# Patient Record
Sex: FEMALE | Race: WHITE | NOT HISPANIC OR LATINO
[De-identification: names, ages, dates, MRNs, and addresses within clinical notes are randomized per-mention and may not be internally consistent; named-entity substitution may affect disease eponyms.]

---

## 2019-08-23 PROBLEM — Z00.00 ENCOUNTER FOR PREVENTIVE HEALTH EXAMINATION: Status: ACTIVE | Noted: 2019-08-23

## 2019-12-13 ENCOUNTER — APPOINTMENT (OUTPATIENT)
Dept: ORTHOPEDIC SURGERY | Facility: CLINIC | Age: 49
End: 2019-12-13
Payer: COMMERCIAL

## 2019-12-13 VITALS — BODY MASS INDEX: 32.39 KG/M2 | WEIGHT: 176 LBS | RESPIRATION RATE: 16 BRPM | HEIGHT: 62 IN

## 2019-12-13 DIAGNOSIS — Z80.42 FAMILY HISTORY OF MALIGNANT NEOPLASM OF PROSTATE: ICD-10-CM

## 2019-12-13 DIAGNOSIS — Z87.891 PERSONAL HISTORY OF NICOTINE DEPENDENCE: ICD-10-CM

## 2019-12-13 DIAGNOSIS — M19.131 POST-TRAUMATIC OSTEOARTHRITIS, RIGHT WRIST: ICD-10-CM

## 2019-12-13 DIAGNOSIS — M19.132 POST-TRAUMATIC OSTEOARTHRITIS, LEFT WRIST: ICD-10-CM

## 2019-12-13 PROCEDURE — 73110 X-RAY EXAM OF WRIST: CPT | Mod: 50

## 2019-12-13 PROCEDURE — 99203 OFFICE O/P NEW LOW 30 MIN: CPT

## 2022-08-22 ENCOUNTER — APPOINTMENT (OUTPATIENT)
Dept: GYNECOLOGIC ONCOLOGY | Facility: CLINIC | Age: 52
End: 2022-08-22
Payer: COMMERCIAL

## 2022-08-22 ENCOUNTER — TRANSCRIPTION ENCOUNTER (OUTPATIENT)
Age: 52
End: 2022-08-22

## 2022-08-22 PROCEDURE — 99205 OFFICE O/P NEW HI 60 MIN: CPT

## 2022-08-22 PROCEDURE — 99072 ADDL SUPL MATRL&STAF TM PHE: CPT

## 2022-10-03 ENCOUNTER — APPOINTMENT (OUTPATIENT)
Dept: GYNECOLOGIC ONCOLOGY | Facility: CLINIC | Age: 52
End: 2022-10-03

## 2022-10-03 PROCEDURE — 99214 OFFICE O/P EST MOD 30 MIN: CPT | Mod: 95

## 2022-10-06 NOTE — DISCUSSION/SUMMARY
[Reviewed Radiology Report(s)] : Radiology reports were reviewed. [Discuss Alternatives/Risks/Benefits w/Patient] : All alternatives, risks, and benefits were discussed with the patient/family and all questions were answered.  Patient expressed good understanding and appreciates the importance of follow up as recommended. [Visit Time ___ Minutes] : [unfilled] minutes [FreeTextEntry1] : 53yo w/ chronic pelvic pain, right ovarian cysts, cervical stump retained from prior ISH, sigmoid diverticulitis flares/ diverticulosis. Desires surgical mgmt. \par -entire televisit spent counseling patient and coordinating care.  I reviewed in detail imaging findings, and prior lab results, and reassured patient that her process here is likely benign in nature with very low suspicion for cancer.  I explained that surgical management will likely improve pelvic pain symptoms however she has to understand that given her chronic pelvic pain history she may have persistent pain after surgery as the root cause of the pain may not be resolved with the surgery. all questions answered and patient expressed understanding.\par -CODY - will have urologist at [facility] to evaluate this; pt will also need stents w/ ICG a time of surgery and this will also be done by urology\par -Diverticulosis w/ hx of diverticulitis  - pt to see Dr. Suresh again for surgical counseling and booking. If candidate for surgical mgmt then will plan joint surgery.\par -Surgical booking: Robotic rso/trachelectomy/possible staging/possible laparotomy\par -ERAS, pre-op, PSTs, covid testing\par -the above diagnosis, nature of treatment, procedure, options, benefits, and risks were reviewed. I explained in detail the possible complications including but not limited to bleeding, transfusion, transfusion complications, infection, injury to internal organs such as injury to gastrointestinal or urinary tract, possible fistula formation, prolonged catherization, intestinal or urinary tract obstruction, vascular injury, wound complications, venous thrombosis, pulmonary embolus, sepsis, pain, chronic disability, and fatal complications, possible re-operation to correct complications, and possible abandoning or modifying the procedure due to inoperative findings was discussed. All questions were answered. Patient verbally indicated that she understood the nature of treatment, indications, options, benefits, and risks. Patient elected and consented to the procedure. Pt was informed that there was no guarantee for outcome or cure, and that additional therapy may be indicated depending on the operative findings. \par -f/u post-op. \par \par

## 2022-10-06 NOTE — HISTORY OF PRESENT ILLNESS
[Home] : at home, [unfilled] , at the time of the visit. [Other Location: e.g. Home (Enter Location, City,State)___] : at [unfilled] [Verbal consent obtained from patient] : the patient, [unfilled] [FreeTextEntry1] : 51yo w/ chronic pelvic pain, right ovarian cysts, cervical stump retained from prior ISH, sigmoid diverticulitis. Desires surgical mgmt. Televisit today for results review of recent pelvic MRI.\par \par Since last visit patient continues to have left sided pain over the last couple days. She did call her PCP who prescribed percocet which she used 1 tab daily for 3 days over the past week with some relief. She had constipation which worsened her left sided pain but after stool softeners that has resolved. No vaginal bleeding, n/v, + mild midline pelvic pain.\par \par Labs 9/1/22\par ca125: 6\par HE4 : 43\par \par Pelvic MRI 9/7/22 reviewed with patient:\par Uterine remnant is small with no myometrial abnormality, the endometrium is homogeneous and with normal thickness, normal junctional zone.  Simple appearing right ovarian cyst lesion is present without nodularity, there is intermediate T1 signal within the lesion suggested as proteinaceous or hemorrhagic products.  The cyst measuring up to 3 cm.  Sigmoid diverticulosis is noted without diverticulitis.  No ascites.

## 2022-11-07 ENCOUNTER — APPOINTMENT (OUTPATIENT)
Dept: GYNECOLOGIC ONCOLOGY | Facility: CLINIC | Age: 52
End: 2022-11-07
Payer: COMMERCIAL

## 2022-11-07 DIAGNOSIS — N83.291 OTHER OVARIAN CYST, RIGHT SIDE: ICD-10-CM

## 2022-11-07 PROCEDURE — 99213 OFFICE O/P EST LOW 20 MIN: CPT | Mod: 95

## 2022-11-07 NOTE — HISTORY OF PRESENT ILLNESS
[Home] : at home, [unfilled] , at the time of the visit. [Medical Office: ([facility])___] : at the medical office located in  [Verbal consent obtained from patient] : the patient, [unfilled] [FreeTextEntry1] : 51yo w/ chronic pelvic pain, right ovarian cysts, cervical stump retained from prior ISH, sigmoid diverticulitis flares/ diverticulosis. For surgical mgmt. Televisit today to review surgical plan.\par \par Since last visit patient without new complaints.

## 2022-11-07 NOTE — DISCUSSION/SUMMARY
[Reviewed Radiology Report(s)] : Radiology reports were reviewed. [Discuss Alternatives/Risks/Benefits w/Patient] : All alternatives, risks, and benefits were discussed with the patient/family and all questions were answered.  Patient expressed good understanding and appreciates the importance of follow up as recommended. [Visit Time ___ Minutes] : [unfilled] minutes [FreeTextEntry1] : 51yo w/ chronic pelvic pain, right ovarian cysts, cervical stump retained from prior ISH, sigmoid diverticulitis flares/ diverticulosis. For definitive surgical mgmt. \par -entire televisit spent counseling patient and coordinating care.\par -I reviewed surgical plan as Robo trachelectomy/rso/possible staging/possible elap. I reviewed joint procedure with Dr. Suresh performing robo sigmoid resection. I also reviewed urology team will place stents at start of procedure to prevent injury to ureters. I reviewed r/b of procedure, post-op recovery, and follow up after surgery. all questions answered and patient expressed understanding\par -Surgical booking as planned for 11/15/22. pt s/p PSTs clinic and going for COVID test this weekend\par -pain/fever/bleeding precautions given\par

## 2022-11-14 ENCOUNTER — RESULT REVIEW (OUTPATIENT)
Age: 52
End: 2022-11-14

## 2022-11-15 ENCOUNTER — RESULT REVIEW (OUTPATIENT)
Age: 52
End: 2022-11-15

## 2022-11-15 ENCOUNTER — APPOINTMENT (OUTPATIENT)
Dept: GYNECOLOGIC ONCOLOGY | Facility: CLINIC | Age: 52
End: 2022-11-15

## 2022-11-18 ENCOUNTER — TRANSCRIPTION ENCOUNTER (OUTPATIENT)
Age: 52
End: 2022-11-18

## 2022-11-30 ENCOUNTER — APPOINTMENT (OUTPATIENT)
Dept: GYNECOLOGIC ONCOLOGY | Facility: CLINIC | Age: 52
End: 2022-11-30
Payer: SELF-PAY

## 2022-11-30 VITALS
DIASTOLIC BLOOD PRESSURE: 56 MMHG | HEART RATE: 96 BPM | WEIGHT: 183 LBS | BODY MASS INDEX: 28.72 KG/M2 | TEMPERATURE: 98.4 F | HEIGHT: 67 IN | SYSTOLIC BLOOD PRESSURE: 109 MMHG | OXYGEN SATURATION: 96 %

## 2022-11-30 DIAGNOSIS — Z80.0 FAMILY HISTORY OF MALIGNANT NEOPLASM OF DIGESTIVE ORGANS: ICD-10-CM

## 2022-11-30 PROCEDURE — 99024 POSTOP FOLLOW-UP VISIT: CPT

## 2022-11-30 PROCEDURE — 99213 OFFICE O/P EST LOW 20 MIN: CPT | Mod: 24

## 2022-11-30 RX ORDER — ESCITALOPRAM OXALATE 10 MG/1
10 TABLET, FILM COATED ORAL
Refills: 0 | Status: ACTIVE | COMMUNITY

## 2022-12-07 NOTE — DISCUSSION/SUMMARY
[Reviewed Clinical Lab Test(s)] : Results of clinical tests were reviewed. [Discuss Alternatives/Risks/Benefits w/Patient] : All alternatives, risks, and benefits were discussed with the patient/family and all questions were answered.  Patient expressed good understanding and appreciates the importance of follow up as recommended. [Visit Time ___ Minutes] : [unfilled] minutes [Face to Face Time___ Minutes] : with [unfilled] minutes in face to face consultation. [FreeTextEntry1] : 53yo w/ chronic pelvic pain, right ovarian cysts, cervical stump retained from prior ISH, sigmoid diverticulitis flares/ diverticulosis. Now s/p definitive surgical mgmt. Recovering well. 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 No intervention needed.\par -more than 50% of visit spent face to face with patient reviewing records and interpreting imaging/path/lab results, counseling and coordinating care\par -I reviewed path and benign nature of findings.  I reviewed diagnosis of struma ovarii and benign nature of tumor found. reviewed association with thyroid tissue and recommended baseline TFTs to ensure no abnormalities. all questions answered and patient expressed understanding\par -continue increasing activity as tolerated. continue pelvic rest. \par -rtc 4-5wks for cuff check\par -pain/fever/bleeding precautions given\par

## 2022-12-07 NOTE — HISTORY OF PRESENT ILLNESS
[FreeTextEntry1] : 53yo w/ chronic pelvic pain, right ovarian cyst, cervical stump retained from prior ISH, sigmoid diverticulitis flares/ diverticulosis. Now s/p Robotic RSO/KAN/trachelectomy/ sigmoid resection (joint case w. Dr. Suresh). Here for initial post-op visit.\par \par Since procedure patient reports doing well. She has some mild abdominal soreness and cramping, not taking pain meds regularly. reports normal activity, good appetite. normal urination and BMs. no vaginal bleeding.\par \par \par Path reviewed:\par FINAL CYTOLOGIC DIAGNOSIS\par \par PELVIC WASHINGS (THIN PREP):\par NO MALIGNANT CELLS OBSERVED.\par MESOTHELIAL CELLS AND MONONUCLEAR LEUKOCYTES PRESENT.\par \par FINAL PATHOLOGIC DIAGNOSIS\par \par A. Ovary and fallopian tube, Right, salpingo-oophorectomy:\par - Struma ovarii, 3.5 cm\par - Fallopian tube without significant abnormality\par \par B. Cervical stump:\par - Cervix without significant abnormality\par \par C. Colon, Recto-sigmoid, Resection:\par - Diverticular disease\par - Surgical margins are unremarkable\par \par D. Submitted as: "EE[employer] donuts":\par - Portion of colon without significant abnormality\par \par Jose Maldonado MD\par 11/17/22 - [address]\par

## 2022-12-13 ENCOUNTER — NON-APPOINTMENT (OUTPATIENT)
Age: 52
End: 2022-12-13

## 2023-01-06 ENCOUNTER — APPOINTMENT (OUTPATIENT)
Dept: GYNECOLOGIC ONCOLOGY | Facility: CLINIC | Age: 53
End: 2023-01-06
Payer: COMMERCIAL

## 2023-01-06 VITALS — SYSTOLIC BLOOD PRESSURE: 120 MMHG | HEART RATE: 64 BPM | DIASTOLIC BLOOD PRESSURE: 58 MMHG | OXYGEN SATURATION: 97 %

## 2023-01-06 DIAGNOSIS — K57.32 DIVERTICULITIS OF LARGE INTESTINE W/OUT PERFORATION OR ABSCESS W/OUT BLEEDING: ICD-10-CM

## 2023-01-06 PROCEDURE — 99024 POSTOP FOLLOW-UP VISIT: CPT

## 2023-01-06 PROCEDURE — 99213 OFFICE O/P EST LOW 20 MIN: CPT | Mod: 24

## 2023-01-09 NOTE — DISCUSSION/SUMMARY
[Discuss Alternatives/Risks/Benefits w/Patient] : All alternatives, risks, and benefits were discussed with the patient/family and all questions were answered.  Patient expressed good understanding and appreciates the importance of follow up as recommended. [Visit Time ___ Minutes] : [unfilled] minutes [Face to Face Time___ Minutes] : with [unfilled] minutes in face to face consultation. [FreeTextEntry1] : 53yo w/ chronic pelvic pain, right ovarian cysts, cervical stump retained from prior ISH, sigmoid diverticulitis flares/ diverticulosis. Now s/p definitive surgical mgmt. 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 No intervention needed. Doing very well. No recurrence of pelvic pain since surgery.\par -more than 50% of visit spent face to face with patient counseling and coordinating care. I reviewed benign path and no further intervention. Reviewed that patient can resume care with annual well women visits with general GYN. She does not need any further paps.\par -pt can resume all activities without limitation\par -rtc 3 months and then likely dc to general GYN\par -pain/fever/bleeding precautions given\par \par

## 2023-01-09 NOTE — HISTORY OF PRESENT ILLNESS
[FreeTextEntry1] : 51yo w/ chronic pelvic pain, right ovarian cyst, cervical stump retained from prior ISH, sigmoid diverticulitis flares/ diverticulosis. Now s/p Robotic RSO/KAN/trachelectomy/ sigmoid resection (joint case w. Dr. Suresh). Here for cuff check\par \par Since last visit patient reports doing well. She denies pain/fever/bleeding/bloating. She has normal activity tolerance and normal appetite. Reports normal urination and BMs. \par

## 2023-02-22 ENCOUNTER — NON-APPOINTMENT (OUTPATIENT)
Age: 53
End: 2023-02-22

## 2023-04-10 PROBLEM — D27.0: Status: ACTIVE | Noted: 2022-11-30

## 2023-04-12 ENCOUNTER — APPOINTMENT (OUTPATIENT)
Dept: GYNECOLOGIC ONCOLOGY | Facility: CLINIC | Age: 53
End: 2023-04-12
Payer: COMMERCIAL

## 2023-04-12 VITALS
WEIGHT: 183 LBS | HEART RATE: 58 BPM | DIASTOLIC BLOOD PRESSURE: 62 MMHG | OXYGEN SATURATION: 100 % | SYSTOLIC BLOOD PRESSURE: 127 MMHG | BODY MASS INDEX: 28.66 KG/M2

## 2023-04-12 DIAGNOSIS — R10.2 PELVIC AND PERINEAL PAIN: ICD-10-CM

## 2023-04-12 DIAGNOSIS — G89.29 PELVIC AND PERINEAL PAIN: ICD-10-CM

## 2023-04-12 DIAGNOSIS — D27.0 BENIGN NEOPLASM OF RIGHT OVARY: ICD-10-CM

## 2023-04-12 PROCEDURE — 99072 ADDL SUPL MATRL&STAF TM PHE: CPT

## 2023-04-12 PROCEDURE — 99213 OFFICE O/P EST LOW 20 MIN: CPT

## 2023-04-12 NOTE — HISTORY OF PRESENT ILLNESS
[FreeTextEntry1] : 53yo w/ chronic pelvic pain, right ovarian cyst, cervical stump retained from prior ISH, sigmoid diverticulitis flares/ diverticulosis. s/p Robotic RSO/KAN/trachelectomy/ sigmoid resection (joint case w. Dr. Suresh) on 11/15/22. 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 She denies pain/fever/bleeding/bloating. She has normal activity tolerance and normal appetite. Reports normal BMs, continues to c/o mild CODY which has remained unchanged since prior to surgery. she is interested in consult with urogyn but not sure she wants to pursue surgery. She admits to excellent activity tolerance and reports no more pain with intercourse. Only pelvic discomfort she feels is pressure from her bladder occasionally.\par Patient is up to date with health maintenance\par

## 2023-04-12 NOTE — DISCUSSION/SUMMARY
[Discuss Alternatives/Risks/Benefits w/Patient] : All alternatives, risks, and benefits were discussed with the patient/family and all questions were answered.  Patient expressed good understanding and appreciates the importance of follow up as recommended. [Visit Time ___ Minutes] : [unfilled] minutes [Face to Face Time___ Minutes] : with [unfilled] minutes in face to face consultation. [FreeTextEntry1] : 53yo w/ chronic pelvic pain, right ovarian cysts, cervical stump retained from prior ISH, sigmoid diverticulitis flares/ diverticulosis. Now s/p definitive surgical mgmt. 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 No intervention needed. Doing very well.\par -more than 50% of visit spent face to face with patient counseling and coordinating care\par -I reviewed no further intervention needed and patient can resume care with general GYN for annual well women visits.  Patient does not need annual Pap smears but is advised to have continued annual pelvic exams, breast exams and other pertinent well woman assessments.  Reviewed importance of health maintenance with PCP\par -Return to clinic as needed\par -pain/fever/bleeding precautions given\par \par

## 2024-08-23 ENCOUNTER — NON-APPOINTMENT (OUTPATIENT)
Age: 54
End: 2024-08-23

## 2024-09-03 ENCOUNTER — NON-APPOINTMENT (OUTPATIENT)
Age: 54
End: 2024-09-03

## 2024-09-11 ENCOUNTER — NON-APPOINTMENT (OUTPATIENT)
Age: 54
End: 2024-09-11

## 2025-01-24 ENCOUNTER — NON-APPOINTMENT (OUTPATIENT)
Age: 55
End: 2025-01-24

## 2025-07-26 ENCOUNTER — NON-APPOINTMENT (OUTPATIENT)
Age: 55
End: 2025-07-26